# Patient Record
Sex: FEMALE | Race: WHITE | NOT HISPANIC OR LATINO | URBAN - METROPOLITAN AREA
[De-identification: names, ages, dates, MRNs, and addresses within clinical notes are randomized per-mention and may not be internally consistent; named-entity substitution may affect disease eponyms.]

---
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
## 2021-10-21 ENCOUNTER — OFFICE VISIT - RIVER FALLS (OUTPATIENT)
Dept: FAMILY MEDICINE | Facility: CLINIC | Age: 21
End: 2021-10-21

## 2021-10-21 ASSESSMENT — MIFFLIN-ST. JEOR: SCORE: 1430.86

## 2022-02-12 VITALS
HEIGHT: 64 IN | BODY MASS INDEX: 25.44 KG/M2 | TEMPERATURE: 97.9 F | WEIGHT: 149 LBS | OXYGEN SATURATION: 97 % | HEART RATE: 97 BPM

## 2022-02-12 VITALS
RESPIRATION RATE: 16 BRPM | HEIGHT: 63 IN | WEIGHT: 166 LBS | OXYGEN SATURATION: 99 % | BODY MASS INDEX: 29.41 KG/M2 | SYSTOLIC BLOOD PRESSURE: 122 MMHG | DIASTOLIC BLOOD PRESSURE: 68 MMHG | HEART RATE: 80 BPM | TEMPERATURE: 97.7 F

## 2022-02-16 NOTE — PROGRESS NOTES
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  She does think she is doing better today and she has been able to drink water okay.  She has a little unsettled stomach but is not real painful.  Stools have been loose but she denies any blood in the stool.  She has had no travel no changes in medication no friends or family have been ill.  She did do Covid testing at her school was -2 days ago       She also did a home pregnancy test that was negative she is on birth control and has not missed any doses  Review of Systems       See HPI.  All other review of systems negative.  Physical Exam   Vitals & Measurements    T: 97.9  F (Tympanic)  HR: 97 (Peripheral)  SpO2: 97%     HT: 64 in  WT: 149 lb  BMI: 25.57        Alert and oriented no distress       White sclera       Tongue is slightly dry but there is some dark ring under the eyes good capillary refill       Stands normal gait       Lungs are clear       Heart regular  Assessment/Plan       1. Gastroenteritis (K52.9)          Showing some mild dehydration signs says she is able to keep fluids down as recommended she take Zofran to ensure that happens and she restores her hydration I would expect her to have the symptoms resolve over the next couple of days       Orders:         ondansetron, = 1 tab(s) ( 4 mg ), Oral, tid, PRN: nausea, # 10 tab(s), 0 Refill(s), Type: Acute, Pharmacy: [facility], 1 tab(s) Oral tid,PRN:nausea, 64, in, 10/21/21 10:35:00 CDT, Height Measured, 149, lb, 10/21/21 10:35:00 CDT, Weight Aydee..., (Ordered)  Patient Information     Name:LANE CARTER LADARIUS      Address:      [address] [place], [place] [id number]     Sex:Female     YOB: 2000     [phone]     Emergency Contact:AB CARTER     MRN:[id number]     FIN:[id number]     Location:[facility]      Date of Service:10/21/2021      Primary Care Physician:       NONE ,       Attending Physician:       Zachery Sloan MD, [phone]  Problem List/Past Medical History    Ongoing     No qualifying data    Historical     No qualifying data  Procedure/Surgical History     Extraction of wisdom tooth  Medications    drospirenone-ethinyl estradiol 3 mg-0.03 mg oral tablet, 1 tab(s), Oral, daily    traZODone 50 mg oral tablet, 50 mg= 1 tab(s), Oral, tid    venlafaxine 150 mg oral capsule, extended release, 150 mg= 1 cap(s), Oral, daily    Zofran ODT 4 mg oral tablet, disintegrating, 4 mg= 1 tab(s), Oral, tid, PRN  Allergies    No Known Medication Allergies  Social History    Smoking Status     Never smoker     Alcohol      Never     Electronic Cigarette/Vaping      Electronic Cigarette Use: Use, within last 90 days. Type: Nicotine infused.     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     Exercise      Exercise frequency: 3-4 times/week. Exercise type: Running.     Home/Environment      Marital status: Single. 0 children. Living situation: Home/Independent. Injuries/Abuse/Neglect in household: No. Feels unsafe at home: No. Family/Friends available for support: Yes.     Nutrition/Health      Type of diet: Regular. Wants to lose weight: No. Sleeping concerns: No. Feels highly stressed: Yes.     Sexual      Sexually active: Yes. Identifies as female, Sexual orientation: Straight or heterosexual. History of STD: Yes. Contraceptive Use Details: Birth control pill. History of sexual abuse: No.     Substance Abuse      Never     Tobacco      Never (less than 100 in lifetime)  Family History    Diabetes: Grandfather (P) and Grandmother (P).    Migraines.....: Sister.    Seizure: Sister.  Immunizations          Scheduled Immunizations          Dose Date(s)          Hep A, pediatric/adolescent          06/26/2013, 07/28/2014          Hep B-Hib          2001, 04/12/2002, 2001           human papillomavirus vaccine          06/26/2013, 07/28/2014, 10/30/2013          influenza virus vaccine, inactivated          10/15/2018, 10/18/2016, 11/15/2010, 2001, 09/17/2009, 11/23/2007, 12/11/2014, 02/15/2007          IPV          2001, 2001, 2001, 08/29/2006          meningococcal conjugate vaccine          07/28/2014, 10/15/2018          MMR (measles/mumps/rubella)          04/12/2002          MMRV (measles/mumps/rubella/varicella)          08/29/2006          pneumococcal (PCV7)          2001, 2001, 07/11/2002, 2001          tetanus/diphth/pertuss (Tdap) adult/adol          06/26/2013          varicella          07/11/2002          Other Immunizations          DTaP          2001, 2001, 2001, 10/11/2002, 08/29/2006          influenza, H1N1, inactivated          02/03/2010, 03/24/2010

## 2022-02-16 NOTE — NURSING NOTE
Depression Screening Entered On:  10/16/2019 6:32 PM CDT    Performed On:  10/16/2019 6:32 PM CDT by Steve Rich CMA               Depression Screening   Little Interest - Pleasure in Activities :   Several days   Feeling Down, Depressed, Hopeless :   Several days   Initial Depression Screen Score :   2    Trouble Falling or Staying Asleep :   Not at all   Feeling Tired or Little Energy :   Not at all   Poor Appetite or Overeating :   Not at all   Feeling Bad About Yourself :   Not at all   Trouble Concentrating :   Not at all   Moving or Speaking Slowly :   Not at all   Thoughts Better Off Dead or Hurting Self :   Not at all   Detailed Depression Screen Score :   0    Total Depression Screen Score :   2    SAMANTHA Difficulty with Work, Home, Others :   Somewhat difficult   Steve Rich CMA - 10/16/2019 6:32 PM CDT

## 2022-02-16 NOTE — NURSING NOTE
Comprehensive Intake Entered On:  10/21/2021 10:40 AM CDT    Performed On:  10/21/2021 10:35 AM CDT by Debi Moreno CMA               Summary   Chief Complaint :   Nausea, vomiting, diarrhea , x4 days   Advance Directive :   No   Menstrual Status :   Menarcheal   Weight Measured :   149 lb(Converted to: 149 lb 0 oz, 67.585 kg)    Height Measured :   64 in(Converted to: 5 ft 4 in, 162.56 cm)    Body Mass Index :   25.57 kg/m2 (HI)    Body Surface Area :   1.75 m2   Peripheral Pulse Rate :   97 bpm   BP Site :   Right arm   Pulse Site :   Radial artery   BP Method :   Manual   HR Method :   Electronic   Temperature Tympanic :   97.9 DegF(Converted to: 36.6 DegC)    Oxygen Saturation :   97 %   Debi Moreno CMA - 10/21/2021 10:35 AM CDT   Health Status   Allergies Verified? :   Yes   Medication History Verified? :   Yes   Medical History Verified? :   Yes   Pre-Visit Planning Status :   Completed   Debi Moreno CMA - 10/21/2021 10:35 AM CDT   Consents   Consent for Immunization Exchange :   Consent Granted   Consent for Immunizations to Providers :   Consent Granted   Debi Moreno CMA - 10/21/2021 10:35 AM CDT   Problems   (As Of: 10/21/2021 10:40:19 AM CDT)   Meds / Allergies   (As Of: 10/21/2021 10:40:19 AM CDT)   Allergies (Active)   No Known Medication Allergies  Estimated Onset Date:   Unspecified ; Created By:   Steve Rich CMA; Reaction Status:   Active ; Category:   Drug ; Substance:   No Known Medication Allergies ; Type:   Allergy ; Updated By:   Steve Rich CMA; Reviewed Date:   10/21/2021 10:39 AM CDT        Medication List   (As Of: 10/21/2021 10:40:19 AM CDT)   Prescription/Discharge Order    tobramycin ophthalmic  :   tobramycin ophthalmic ; Status:   Completed ; Ordered As Mnemonic:   tobramycin 0.3% ophthalmic solution ; Simple Display Line:   1 drop(s), Eye-Right, qid, for 7 day(s), 5 mL, 0 Refill(s) ; Ordering Provider:   Tim Henson PA-C; Catalog Code:   tobramycin ophthalmic ;  Order Dt/Tm:   10/16/2019 6:29:00 PM CDT            Home Meds    doxepin  :   doxepin ; Status:   Completed ; Ordered As Mnemonic:   doxepin 3 mg oral tablet ; Simple Display Line:   3 mg, 1 tab(s), Oral, hs, 0 Refill(s) ; Catalog Code:   doxepin ; Order Dt/Tm:   10/16/2019 5:52:13 PM CDT          drospirenone-ethinyl estradiol  :   drospirenone-ethinyl estradiol ; Status:   Documented ; Ordered As Mnemonic:   drospirenone-ethinyl estradiol 3 mg-0.03 mg oral tablet ; Simple Display Line:   1 tab(s), Oral, daily, 28 tab(s), 0 Refill(s) ; Catalog Code:   drospirenone-ethinyl estradiol ; Order Dt/Tm:   10/16/2019 5:52:56 PM CDT          FLUoxetine  :   FLUoxetine ; Status:   Completed ; Ordered As Mnemonic:   FLUoxetine 20 mg oral tablet ; Simple Display Line:   20 mg, 1 tab(s), Oral, daily, 30 tab(s), 0 Refill(s) ; Catalog Code:   FLUoxetine ; Order Dt/Tm:   10/16/2019 5:52:22 PM CDT          traZODone  :   traZODone ; Status:   Documented ; Ordered As Mnemonic:   traZODone 50 mg oral tablet ; Simple Display Line:   50 mg, 1 tab(s), Oral, tid, 90 tab(s), 0 Refill(s) ; Catalog Code:   traZODone ; Order Dt/Tm:   10/21/2021 10:39:14 AM CDT          venlafaxine  :   venlafaxine ; Status:   Documented ; Ordered As Mnemonic:   venlafaxine 150 mg oral capsule, extended release ; Simple Display Line:   150 mg, 1 cap(s), Oral, daily, 30 cap(s), 0 Refill(s) ; Catalog Code:   venlafaxine ; Order Dt/Tm:   10/21/2021 10:39:06 AM CDT            ID Risk Screen   Recent Travel History :   No recent travel   Family Member Travel History :   No recent travel   Other Exposure to Infectious Disease :   Unknown   COVID-19 Testing Status :   No positive COVID-19 test   AndDebi stokes CMA - 10/21/2021 10:35 AM CDT   Social History   Social History   (As Of: 10/21/2021 10:40:19 AM CDT)   Alcohol:        Never   (Last Updated: 10/16/2019 5:53:44 PM CDT by Steve Rich CMA)          Tobacco:        Never (less than 100 in lifetime)   (Last  Updated: 10/16/2019 5:53:48 PM CDT by Steve Rich CMA)   Never (less than 100 in lifetime)   (Last Updated: 10/21/2021 10:38:12 AM CDT by Debi Moreno CMA)          Electronic Cigarette/Vaping:        Electronic Cigarette Use: Use, within last 90 days.  Type: Nicotine infused.   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   (Last Updated: 10/16/2019 5:54:00 PM CDT by Steve Rich CMA)          Home/Environment:        Marital status: Single.  0 children.  Living situation: Home/Independent.  Injuries/Abuse/Neglect in household: No.  Feels unsafe at home: No.  Family/Friends available for support: Yes.   (Last Updated: 10/16/2019 6:33:41 PM CDT by Steve Rich CMA)          Nutrition/Health:        Type of diet: Regular.  Wants to lose weight: No.  Sleeping concerns: No.  Feels highly stressed: Yes.   (Last Updated: 10/16/2019 6:34:07 PM CDT by Steve Rich CMA)          Exercise:        Exercise frequency: 3-4 times/week.  Exercise type: Running.   (Last Updated: 10/16/2019 6:34:16 PM CDT by Steve Rich CMA)          Sexual:        Sexually active: Yes.  Identifies as female, Sexual orientation: Straight or heterosexual.  History of STD: Yes.  Contraceptive Use Details: Birth control pill.  History of sexual abuse: No.   (Last Updated: 10/16/2019 6:34:47 PM CDT by Steve Rich CMA)

## 2022-02-16 NOTE — PROGRESS NOTES
Patient:   LANE CARTER            MRN: [id number]            FIN: [id number]               Age:   19 years     Sex:  Female     :  2000   Associated Diagnoses:   Acute maxillary sinusitis; Conjunctivitis; Pharyngitis   Author:   Sixto SCOTT, Tim GRIFFIN      Chief Complaint   10/16/2019 5:50 PM CDT   New pt here  for sore throat,sinus pressure and productive cough x 2 weeks.  Pt also c/o Right eye redness and discharge that started this morning.      History of Present Illness   Chief complaint and symptoms noted above and confirmed with patient   as above  using dayquil and nyquil         Review of Systems   Constitutional:  No fever.    Eye:       Discharge: Right.    Ear/Nose/Mouth/Throat:  Sore throat, sinus pressure.    Respiratory:  Cough, Sputum production.    Gastrointestinal:  Nausea, Vomiting.       Health Status   Allergies:    Allergic Reactions (Selected)  No Known Medication Allergies   Medications:  (Selected)   Documented Medications  Documented  FLUoxetine 20 mg oral tablet: = 1 tab(s) ( 20 mg ), Oral, daily, # 30 tab(s), 0 Refill(s), Type: Maintenance  doxepin 3 mg oral tablet: = 1 tab(s) ( 3 mg ), Oral, hs, 0 Refill(s), Type: Maintenance  drospirenone-ethinyl estradiol 3 mg-0.03 mg oral tablet: 1 tab(s), Oral, daily, # 28 tab(s), 0 Refill(s), Type: Maintenance   Problem list:    No problem items selected or recorded.      Histories   Past Medical History:    No active or resolved past medical history items have been selected or recorded.   Family History:    Seizure  Sister  Diabetes  Grandfather (P)  Grandmother (P)  Migraines.....  Sister     Procedure history:    Extraction of wisdom tooth ([id number]).   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         Physical Examination   Vital Signs   10/16/2019 5:50 PM CDT Temperature Tympanic 97.7 DegF  LOW    Peripheral Pulse Rate 80 bpm    Pulse Site Radial artery    Respiratory Rate 16 br/min    Systolic Blood Pressure 122 mmHg    Diastolic Blood Pressure 68 mmHg    Mean Arterial Pressure 86 mmHg    BP Site Right arm    Oxygen Saturation 99 %      General:  No acute distress.    Eye:  right sclera and conjunctiva are moderately injected, slight mattering.  Left eye is normal, without injection.    HENT:  Tympanic membranes are clear, No sinus tenderness, nares are patent, but nasal turbinates are inflamed and narrowed, ooropharynx mildly enlarged and inflamed without exudate.    Neck:  Supple, Non-tender, No lymphadenopathy.    Respiratory:  Lungs are clear to auscultation.    Cardiovascular:  Normal rate, Regular rhythm, No murmur.       Review / Management   Results review:       Interpretation: rapid strep is negative; culture pending, will call if abnormal results..       Impression and Plan   Diagnosis     Acute maxillary sinusitis (JPW35-VL J01.00).     Conjunctivitis (CPB19-AK H10.9).     Pharyngitis (RAF32-MS J02.9).     Patient Instructions:   Encouraged to use heat over the sinuses, salt water nasal spray, decongestants and expectorants, NSAIDs.  Follow up if not improving.    Summary:  will treat as sinusitis with amoxicillin for 7 days and tobramycin eye drops for 7 days, continue with dayquil and nyquil.    Orders     Orders   Pharmacy:  tobramycin 0.3% ophthalmic solution (Prescribe): 1 drop(s), Eye-Right, qid, x 7 day(s), # 5 mL, 0 Refill(s), Type: Maintenance, Pharmacy: [facility], 1 drop(s) Eye-Right qid,x7 day(s)  amoxicillin 875 mg oral tablet (Prescribe): = 1 tab(s) ( 875 mg ), Oral, bid, x 7 day(s), # 14 tab(s), 0 Refill(s), Type: Acute, Pharmacy: [facility], 1 tab(s) Oral bid,x7 day(s).     Orders   Charges (Evaluation and Management):  [id number] office  outpatient new 20 minutes (Charge) (Order): Quantity: 1, Acute maxillary sinusitis  Conjunctivitis  Pharyngitis.

## 2022-02-16 NOTE — NURSING NOTE
CAGE Assessment Entered On:  10/16/2019 6:32 PM CDT    Performed On:  10/16/2019 6:32 PM CDT by Steve Rich CMA               Assessment   Have you ever felt you should cut down on your drinking :   No   Have people annoyed you by criticizing your drinking :   No   Have you ever felt bad or guilty about your drinking :   No   Have you ever taken a drink first thing in the morning to steady your nerves or get rid of a hangover (Eye-opener) :   No   CAGE Score :   0    Steve Rich CMA - 10/16/2019 6:32 PM CDT

## 2022-02-16 NOTE — NURSING NOTE
Comprehensive Intake Entered On:  10/16/2019 5:57 PM CDT    Performed On:  10/16/2019 5:50 PM CDT by Steve Rich CMA               Summary   Weight Measured :   166 lb(Converted to: 166 lb 0 oz, 75.30 kg)    Height Measured :   63 in(Converted to: 5 ft 3 in, 160.02 cm)    Body Mass Index :   29.4 kg/m2   Body Surface Area :   1.83 m2   Steve Rich CMA - 10/16/2019 6:35 PM CDT   Chief Complaint :   New pt here  for sore throat,sinus pressure and productive cough x 2 weeks.  Pt also c/o Right eye redness and discharge that started this morning.   Systolic Blood Pressure :   122 mmHg   Diastolic Blood Pressure :   68 mmHg   Mean Arterial Pressure :   86 mmHg   Peripheral Pulse Rate :   80 bpm   BP Site :   Right arm   Pulse Site :   Radial artery   Temperature Tympanic :   97.7 DegF(Converted to: 36.5 DegC)  (LOW)    Respiratory Rate :   16 br/min   Oxygen Saturation :   99 %   Steve Rihc CMA - 10/16/2019 5:50 PM CDT   Health Status   Allergies Verified? :   Yes   Medication History Verified? :   Yes   Medical History Verified? :   Yes   Pre-Visit Planning Status :   Not completed   Tobacco Use? :   Never smoker   Steve Rich CMA - 10/16/2019 5:50 PM CDT   Meds / Allergies   (As Of: 10/16/2019 5:57:04 PM CDT)   Allergies (Active)   No Known Medication Allergies  Estimated Onset Date:   Unspecified ; Created By:   Steve Rich CMA; Reaction Status:   Active ; Category:   Drug ; Substance:   No Known Medication Allergies ; Type:   Allergy ; Updated By:   Steve Rich CMA; Reviewed Date:   10/16/2019 5:53 PM CDT        Medication List   (As Of: 10/16/2019 5:57:04 PM CDT)   Home Meds    FLUoxetine  :   FLUoxetine ; Status:   Documented ; Ordered As Mnemonic:   FLUoxetine 20 mg oral tablet ; Simple Display Line:   20 mg, 1 tab(s), Oral, daily, 30 tab(s), 0 Refill(s) ; Catalog Code:   FLUoxetine ; Order Dt/Tm:   10/16/2019 5:52:22 PM CDT          drospirenone-ethinyl estradiol  :   drospirenone-ethinyl  estradiol ; Status:   Documented ; Ordered As Mnemonic:   drospirenone-ethinyl estradiol 3 mg-0.03 mg oral tablet ; Simple Display Line:   1 tab(s), Oral, daily, 28 tab(s), 0 Refill(s) ; Catalog Code:   drospirenone-ethinyl estradiol ; Order Dt/Tm:   10/16/2019 5:52:56 PM CDT          doxepin  :   doxepin ; Status:   Documented ; Ordered As Mnemonic:   doxepin 3 mg oral tablet ; Simple Display Line:   3 mg, 1 tab(s), Oral, hs, 0 Refill(s) ; Catalog Code:   doxepin ; Order Dt/Tm:   10/16/2019 5:52:13 PM CDT            Procedures / Surgeries        -    Procedure History   (As Of: 10/16/2019 5:57:04 PM CDT)     Anesthesia Minutes:   0 ; Procedure Name:   Extraction of wisdom tooth ; Procedure Minutes:   0 ; Last Reviewed Dt/Tm:   10/16/2019 5:53:30 PM CDT            Family History   Family History   (As Of: 10/16/2019 5:57:04 PM CDT)   Sister:   Relation:   Sister ; Gender:   Female ;           Nomenclature:   Migraines..... ; Value:   Positive            Nomenclature:   Seizure ; Value:   Positive          Grandmother (P):   Relation:   Grandmother (P) ;           Nomenclature:   Diabetes ; Value:   Positive          Grandfather (P):   Relation:   Grandfather (P) ;           Nomenclature:   Diabetes ; Value:   Positive            Social History   Social History   (As Of: 10/16/2019 5:57:04 PM CDT)   Alcohol:        Never   (Last Updated: 10/16/2019 5:53:44 PM CDT by Steve Rich CMA)          Tobacco:        Never (less than 100 in lifetime)   (Last Updated: 10/16/2019 5:53:48 PM CDT by Steve Rich CMA)          Substance Abuse:        Never   (Last Updated: 10/16/2019 5:53:51 PM CDT by Steve Rich CMA)          Employment/School:        Student, Work/School description: Freshman at [employer].   (Last Updated: 10/16/2019 5:54:00 PM CDT by Steve Rich CMA)